# Patient Record
Sex: MALE | Race: WHITE | NOT HISPANIC OR LATINO | Employment: FULL TIME | URBAN - METROPOLITAN AREA
[De-identification: names, ages, dates, MRNs, and addresses within clinical notes are randomized per-mention and may not be internally consistent; named-entity substitution may affect disease eponyms.]

---

## 2023-07-24 ENCOUNTER — TRANSCRIBE ORDERS (OUTPATIENT)
Dept: ADMINISTRATIVE | Facility: HOSPITAL | Age: 53
End: 2023-07-24
Payer: COMMERCIAL

## 2023-07-24 ENCOUNTER — HOSPITAL ENCOUNTER (OUTPATIENT)
Dept: GENERAL RADIOLOGY | Facility: HOSPITAL | Age: 53
Discharge: HOME | End: 2023-07-24
Payer: COMMERCIAL

## 2023-07-24 DIAGNOSIS — M79.605 LEFT LEG PAIN: ICD-10-CM

## 2023-07-24 DIAGNOSIS — M79.605 LEFT LEG PAIN: Primary | ICD-10-CM

## 2023-07-24 PROCEDURE — 73590 X-RAY EXAM OF LOWER LEG: CPT

## 2023-08-29 ENCOUNTER — OFFICE VISIT (OUTPATIENT)
Dept: NEUROSURGERY | Facility: CLINIC | Age: 53
End: 2023-08-29
Payer: COMMERCIAL

## 2023-08-29 VITALS
WEIGHT: 240 LBS | HEIGHT: 76 IN | BODY MASS INDEX: 29.22 KG/M2 | TEMPERATURE: 98 F | DIASTOLIC BLOOD PRESSURE: 82 MMHG | SYSTOLIC BLOOD PRESSURE: 144 MMHG

## 2023-08-29 DIAGNOSIS — M54.12 CERVICAL RADICULOPATHY: Primary | ICD-10-CM

## 2023-08-29 DIAGNOSIS — M50.30 DEGENERATIVE DISC DISEASE, CERVICAL: ICD-10-CM

## 2023-08-29 DIAGNOSIS — M47.812 CERVICAL SPONDYLOSIS WITHOUT MYELOPATHY: ICD-10-CM

## 2023-08-29 RX ORDER — IBUPROFEN 800 MG/1
800 TABLET ORAL
COMMUNITY
Start: 2023-06-29

## 2023-08-29 NOTE — PROGRESS NOTES
Office Note     Name: Elías Ascencio    : 1970     MRN: [id number]     PCP: Ethel Kaba APRN    Chief Complaint  Left neck and shoulder pain with sensory alteration.    Subjective     History of Present Illness:  Mr. Ascencio is a pleasant 52-year-old right-handed gentleman who works as a  and builds substation's.  He presented to the emergency department on 2023 with complaints of left arm pain.  His symptoms originated however around . He is here for follow-up today.  He reports pain radiating in the left side of his neck and shoulder.  This extends on down to his left hand.  Symptoms are exacerbated during any type of activity and when he puts his hands above or behind his head.  His symptoms are intermittent in nature and worse at night.  He denies trauma or inciting event.  He reports numbness and tingling extending on down to his hand as well.  He reports that this is in all of his digits.  He denies loss of  strength.  He denies bowel bladder dysfunctions, saddle anesthesia, gait dysfunction or woody weakness.  He has not tried any type of formal physical therapy in the past. He has been utilizing Tylenol and Ibuprofen for relief.  No other complaints at this time.    Review of Systems:   Review of Systems   Neurological:  Positive for numbness. Negative for weakness.     The patient's past medical history, past surgical history, family history, and social history have been reviewed at length in the electronic medical record.       Past Medical History: No past medical history on file.    Past Surgical History: No past surgical history on file.    Family History: No family history on file.    Social History:   Social History     Socioeconomic History    Marital status: Single   Tobacco Use    Smoking status: Some Days     Packs/day: 0.25     Types: Cigarettes   Substance and Sexual Activity    Alcohol use: Not Currently    Drug use: Never    Sexual activity: Yes  "    Partners: Female       Immunizations:   There is no immunization history on file for this patient.     Medications:     Current Outpatient Medications:     ibuprofen (ADVIL,MOTRIN) 800 MG tablet, 1 tablet., Disp: , Rfl:     Allergies:   Allergies   Allergen Reactions    Ceclor [Cefaclor] Swelling    Nitrofurazone Swelling       Objective     Vital Signs  Ht 193 cm (76\")   Wt 109 kg (240 lb)   BMI 29.21 kg/mý   Estimated body mass index is 29.21 kg/mý as calculated from the following:    Height as of this encounter: 193 cm (76\").    Weight as of this encounter: 109 kg (240 lb).    Physical Exam   Constitutional: Patient is well-developed and appears stated age. Pleasant and   cooperative. Appears in no acute distress.   HENT:   Head normocephalic and atraumatic.  Eyes: Pupils are equal, round, and reactive to light.   Musculoskeletal:     Strength is intact in upper and lower extremities to direct testing.     Station and gait are normal.     Range of motion of the cervical spine is normal.  Tenderness to palpation is not observed.  Neurologic:     Muscle tone is normal throughout.     Coordination is intact.     No clonus is elicited.      Deep tendon reflexes: 2+ and symmetrical.     Sensation is intact to light touch throughout.     Patient is oriented to person, place, and time.     Kristi sign negative bilaterally  Psychiatric: Normal behavior and thought content.  Skin: Clean, dry, and intact.     Tobacco Use: High Risk    Smoking Tobacco Use: Some Days    Smokeless Tobacco Use: Unknown    Passive Exposure: Not on file        Body mass index is 29.21 kg/mý.    Fall Risk Assessment was completed, and patient is at low risk for falls.    Assessment and Plan     Medical Decision Making     Data Review:   (All imaging independently reviewed unless stated otherwise.)   MRI of the  cervical spine dated 7/3/23 reveals degenerative changes throughout the cervical spine. There is normal signal within the cord. " At C6-7 there is left foraminal narrowing with a disc osteophyte complex resulting in moderate foraminal narrowing. Disc bulging at C4-5 and C5-6. At C2-3 there is advanced left facet arthropathy with mild left neural foraminal narrowing.       Diagnosis:  1.  Cervical radiculopathy  2.  Degenerative disc disease, cervical    Treatment Options:   Mr. Marshall presents today with left arm and shoulder pain extending into the hand.  He denies trauma or inciting event.  Long tract signs are absent upon examination today.  I have reviewed and discussed imaging with him today.  I will refer him to physical therapy with traction.  He will also obtain electrodiagnostic studies of bilateral upper extremities.  He has deferred trying any type of epidural steroid injections at this time.  Pending results, further recommendations will ensue thereafter.          Diagnosis Plan   1. Cervical radiculopathy  EMG & Nerve Conduction Test    Ambulatory Referral to Physical Therapy Evaluate and treat; Stretching (Traction), ROM, Strengthening      2. Cervical spondylosis without myelopathy        3. Degenerative disc disease, cervical                Brian Lancaster PA-C  [facility]  [address] [id number] [phone]